# Patient Record
(demographics unavailable — no encounter records)

---

## 2025-01-07 NOTE — HISTORY OF PRESENT ILLNESS
[FreeTextEntry1] : CPE [de-identified] : 41 yo m, pmhx factor V leiden, hyperparathyroidism (in 2018, with 3 parathyroid adenoma removals), here for CPE  overall is feeling well would like to establish care - history of superficial blood clot in July 2024, was on eliquis for a few months then monitor off of medication  had seen by hematology in [place], [place]  denies any other associated symptoms  - was seeing Dr. Ritesh Keyes at head and neck surgeon at [place] also saw Endo  worried about possible ectopic parathyroid  osteopenia on recent dexa scan- repeat annually  denies any other associated symptoms - chronic perirectal abscess  seen by surgeon symptoms stable, would like to hold  denies any other associated symptoms - feeling more anxious  wife currently pregnant with history of trauma  feeling generally anxious and stressed seeing therapist regularly  has been on medication prn for panic attacks   denies any other associated symptoms denies any other complaints or concerns at this time

## 2025-01-07 NOTE — HEALTH RISK ASSESSMENT
[Very Good] : ~his/her~  mood as very good [Yes] : Yes [Monthly or less (1 pt)] : Monthly or less (1 point) [1 or 2 (0 pts)] : 1 or 2 (0 points) [Never (0 pts)] : Never (0 points) [No] : In the past 12 months have you used drugs other than those required for medical reasons? No [0] : 2) Feeling down, depressed, or hopeless: Not at all (0) [PHQ-2 Negative - No further assessment needed] : PHQ-2 Negative - No further assessment needed [Never] : Never [Patient reported bone density results were abnormal] : Patient reported bone density results were abnormal [HIV test declined] : HIV test declined [Hepatitis C test declined] : Hepatitis C test declined [None] : None [With Family] : lives with family [Employed] : employed [] :  [# Of Children ___] : has [unfilled] children [Sexually Active] : sexually active [Feels Safe at Home] : Feels safe at home [Fully functional (bathing, dressing, toileting, transferring, walking, feeding)] : Fully functional (bathing, dressing, toileting, transferring, walking, feeding) [Fully functional (using the telephone, shopping, preparing meals, housekeeping, doing laundry, using] : Fully functional and needs no help or supervision to perform IADLs (using the telephone, shopping, preparing meals, housekeeping, doing laundry, using transportation, managing medications and managing finances) [FreeTextEntry1] : ^ see above  [de-identified] : none  [de-identified] : needs heme, endo, head and neck, derm, nephro  [Audit-CScore] : 1 [de-identified] : DENTON mix  [de-identified] : balanced; supplements- none  [de-identified] : anxiety  [[id number]] : 0 [High Risk Behavior] : no high risk behavior [Reports changes in hearing] : Reports no changes in hearing [Reports changes in vision] : Reports no changes in vision [Reports changes in dental health] : Reports no changes in dental health [BoneDensityDate] : 03/2024  [BoneDensityComments] : repeat in 1 year  [de-identified] : wife and 1 son  [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

## 2025-01-07 NOTE — HEALTH RISK ASSESSMENT
[Very Good] : ~his/her~  mood as very good [Yes] : Yes [Monthly or less (1 pt)] : Monthly or less (1 point) [1 or 2 (0 pts)] : 1 or 2 (0 points) [Never (0 pts)] : Never (0 points) [No] : In the past 12 months have you used drugs other than those required for medical reasons? No [0] : 2) Feeling down, depressed, or hopeless: Not at all (0) [PHQ-2 Negative - No further assessment needed] : PHQ-2 Negative - No further assessment needed [Never] : Never [Patient reported bone density results were abnormal] : Patient reported bone density results were abnormal [HIV test declined] : HIV test declined [Hepatitis C test declined] : Hepatitis C test declined [None] : None [With Family] : lives with family [Employed] : employed [] :  [# Of Children ___] : has [unfilled] children [Sexually Active] : sexually active [Feels Safe at Home] : Feels safe at home [Fully functional (bathing, dressing, toileting, transferring, walking, feeding)] : Fully functional (bathing, dressing, toileting, transferring, walking, feeding) [Fully functional (using the telephone, shopping, preparing meals, housekeeping, doing laundry, using] : Fully functional and needs no help or supervision to perform IADLs (using the telephone, shopping, preparing meals, housekeeping, doing laundry, using transportation, managing medications and managing finances) [FreeTextEntry1] : ^ see above  [de-identified] : none  [de-identified] : needs heme, endo, head and neck, derm, nephro  [Audit-CScore] : 1 [de-identified] : DENTON mix  [de-identified] : balanced; supplements- none  [de-identified] : anxiety  [[id number]] : 0 [High Risk Behavior] : no high risk behavior [Reports changes in hearing] : Reports no changes in hearing [Reports changes in vision] : Reports no changes in vision [Reports changes in dental health] : Reports no changes in dental health [BoneDensityDate] : 03/2024  [BoneDensityComments] : repeat in 1 year  [de-identified] : wife and 1 son  [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

## 2025-01-07 NOTE — HISTORY OF PRESENT ILLNESS
[FreeTextEntry1] : CPE [de-identified] : 41 yo m, pmhx factor V leiden, hyperparathyroidism (in 2018, with 3 parathyroid adenoma removals), here for CPE  overall is feeling well would like to establish care - history of superficial blood clot in July 2024, was on eliquis for a few months then monitor off of medication  had seen by hematology in [place], [place]  denies any other associated symptoms  - was seeing Dr. Ritesh Keyes at head and neck surgeon at [place] also saw Endo  worried about possible ectopic parathyroid  osteopenia on recent dexa scan- repeat annually  denies any other associated symptoms - chronic perirectal abscess  seen by surgeon symptoms stable, would like to hold  denies any other associated symptoms - feeling more anxious  wife currently pregnant with history of trauma  feeling generally anxious and stressed seeing therapist regularly  has been on medication prn for panic attacks   denies any other associated symptoms denies any other complaints or concerns at this time

## 2025-03-10 NOTE — DISCUSSION/SUMMARY
[FreeTextEntry1] : The visit was provided via telehealth using real-time 2-way audio visual technology. The patient, Nathan Cleveland, was located at home in [place], [place] at the time of the visit. The Genetic Counselor, Nan Crow, was located at the medical office located in [place], [place]. The patient and the Genetic Counselor both participated in the telehealth encounter. Consent for telehealth services was given on 3/10/2025 by the patient, Nathan Cleveland.   REASON FOR CONSULT Nathan Cleveland is a 42-year-old male who was referred by Dr. Dayana Cole for cancer genetic counseling and cascade genetic testing due to a pathogenic BRCA1 variant previously detected in his sister.  RELEVANT MEDICAL HISTORY Mr. Cleveland has a history of primary hyperparathyroidism and has had three parathyroid adenomas removed in 2019, 2020, and 2024. He also has a family history of hereditary breast and ovarian cancer syndrome due to a pathogenic BRCA1 variant, see below.   Of note, Mr. Cleveland reported that he underwent germline hereditary cancer genetic testing for genes associated with hyperparathyroidism around 2020. A copy of this report was not available for review at the time of today's session, but Mr. Cleveland reported that he is able to email it to us after this appointment.   OTHER MEDICAL AND SURGICAL HISTORY: -	Medical history: Factor V Leiden, primary hyperparathyroidism (3 parathyroid adenoma removals), superficial blood clot in July 2024, osteopenia, chronic perirectal abscess -	Surgical history: hip surgery, parathyroidectomy 3x in 2019, 2020, and 2024, hemithyroidectomy (during most recent parathyroidectomy)  CANCER SCREENING HISTORY:   Breast: None Prostate: -	PSA done on 1/23/2025: 0.46 ng/mL. Colon:  -	Colonoscopy: baseline reported in 2008 d/t GI issues, reported negative history.  -	Endoscopy: None Skin:   -	FBSE: annual exams, reported negative history.  Other:  -	Calcium done on 1/23/2025: 10.8 mg/dL. -	Intact parathyroid hormone on 1/23/2025: 99 pg/mL. -	Hemithyroidectomy pathology on 3/21/2024: negative for malignancy.   SOCIAL HISTORY: -	 -	Tobacco-product use: None -	Second-hand smoke exposure: None  FAMILY HISTORY: Maternal and paternal ancestry were both reported as Sinhala, Ashkenazi Latter-day. A detailed family history of cancer was ascertained. Relevant diagnoses are detailed below and in the scanned pedigree.   Of note, Mr. Cleveland's sister was diagnosed with breast cancer at age 50 at which time she underwent hereditary cancer germline genetic testing using [facility]'s BRCA Plus Panel and pathogenic variant was detected in the BRCA1 gene (BRCA1 [id number]; p.[id number]). This test was ordered by Dr. Sybil Buckner and reported on 1/21/2025. 	 RISK ASSESSMENT: Mr. Cleveland has a 50% chance to test positive for the pathogenic BRCA1 variant recently identified in his sister. We recommended genetic testing for the BRCA1 and BRCA2 genes at [facility]. Given his paternal uncle's prostate cancer in his early 70s and his maternal grandmother's breast cancer in her 50s, we discussed and offered expanded panel genetic testing for genes associated with breast and prostate cancers using [facility]'s BRCA Next + HOXB13 Panel. This test analyzes 20 genes: KILO, BARD1, BRCA1, BRCA2, BRIP1, CDH1, CHEK2, EPCAM, HOXB13, MLH1, MSH2, MSH6, NF1, PALB2, PMS2, PTEN, RAD51C, RAD51D, STK11, TP53.  Additionally, his personal medical history of primary hyperparathyroidism first diagnosed around his mid-30s may be suggestive of a predisposition to hereditary hyperparathyroidism. Mr. Cleveland informed us that he previously underwent genetic testing related to hyperparathyroidism in [place]. Mr. Cleveland is going to email us those genetic testing records to review, which will help determine the potential role for any additional genetic testing regarding hereditary hyperparathyroidism.   We discussed the risks, benefits and limitations, and implications of genetic testing. We also discussed the psychosocial implications of genetic testing. Possible test results were reviewed with Mr. Cleveland, along with associated medical management options. The Genetic Information Non-discrimination Act (BART) was also reviewed.   Mr. Cleveland verbally consented to the above-mentioned genetic testing panel. Informed consent form was emailed to the patient, and a saliva sample kit will be mailed to the patient. Once the sample has been collected and sent out, Mr. Cleveland will inform us.   PLAN:  1.	Saliva kit was sent to Mr. Cleveland's home for collection. Once collected and dropped off, patient will inform us.  2.	Mr. Cleveland was sent a copy of the informed consent form via email to be filled, signed, and returned to us. 3.	We will contact Mr. Cleveland once the results are available and will schedule a follow-up appointment, as needed. Results generally return in 2-3 weeks from the day the sample is received in the lab.  For any additional questions please call [facility] at [phone].    Nan Crow MS, [facility], [facility]   CC: Dayana Cole MD

## 2025-03-19 NOTE — HISTORY OF PRESENT ILLNESS
[FreeTextEntry8] : 41 yo m, pmhx of factor V leiden, hyperparathyroidism (in 2018, with 3 parathyroid adenoma removals), c/o depression symptoms 0 wife noting he seems more depressed- feels he is exhibiting overwhelmed, so much going and unable to focusing trouble focusing at work self-focused  feels as if going through the motions, feeling numb denies taking any panic attack medication prescribed speaking to therapist regularly  feels that he internalizes most of the emotions  feels not getting gabriela out of things  notes has a short temper and gets angry  denies any violent episodes  wife is currently pregnant, they are more argumentative denies any si/hi denies any other associated symptoms denies any other complaints or concerns at this time

## 2025-05-12 NOTE — DISCUSSION/SUMMARY
[FreeTextEntry1] : RESULTS TRANSMISSION Nathan Cleveland is a 42-year-old male who was called 5/12/2025 for a discussion regarding his genetic testing results related to hereditary cancer predisposition.   Mr. Cleveland was originally seen at [facility] on 3/10/2025 for hereditary cancer predisposition risk assessment and cascade genetic testing. Mr. Cleveland decided to pursue genetic testing using [facility]'s BRCA Next + HOXB13 Panel.  TEST RESULTS: NEGATIVE  The BRCA1 c.68_69delAG (p.E23Vfs*17) variant was not detected in this individual's specimen.  No pathogenic (disease-causing) variants or VUSs were detected in the following genes: KILO, BARD1, BRCA1, BRCA2, BRIP1, CDH1, CHEK2, EPCAM, HOXB13, MLH1, MSH2, MSH6, NF1, PALB2, PMS2, PTEN, RAD51C, RAD51D, STK11, TP53.  RESULTS INTERPRETATION AND ASSESSMENT: It was discussed that Mr. Morriss genetic testing results should be considered a true negative. Given Mr. Cleveland's personal and current reported family history of cancer, his negative genetic test results, and in the absence of other indications, he should practice age-appropriate cancer screening of organ systems as recommended for the general population. We also discussed that the previous negative genetic testing he had done in 2021 related to hyperparathyroidism is comprehensive.    Our knowledge of genetics and inherited cancer conditions is changing rapidly. We emphasized the importance of re-contacting us with updates regarding his personal and family history of cancer as well as any updates regarding additional cancer genetic test results performed for the patient and/or family members. Such updates could possibly change our risk assessment and recommendations. We also informed Mr. Cleveland that our knowledge of genetics and inherited cancer conditions is changing rapidly. Therefore, we recommended that Mr. Cleveland contact our office, every 2 to 3 years, to discuss relevant advances in cancer genetics.     In addition, we discussed the importance of his other family members pursuing genetic counseling with the option of genetic testing for the familial BRCA1 mutation. We would be happy to see any at-risk relatives in the future.  PLAN: 1.	See above for recommended screening and risk-reduction strategies. 2.	Patient informed consult note(s) will be available through their [employer] patient portal and genetic test results will be released via [facility]'s laboratory portal.  3.	Mr. Cleveland was encouraged to contact us every 2-3 years to discuss relevant advances in cancer genetics, or sooner if there are any changes in his personal or family history of cancer.   For any additional questions please call [facility] at [phone].    Nan Crow MS, [facility] [place] [facility] Counselor, [facility]   CC: Dayana Cole MD